# Patient Record
Sex: MALE | Race: WHITE | URBAN - METROPOLITAN AREA
[De-identification: names, ages, dates, MRNs, and addresses within clinical notes are randomized per-mention and may not be internally consistent; named-entity substitution may affect disease eponyms.]

---

## 2020-11-20 ENCOUNTER — AMBULATORY - HEALTHEAST (OUTPATIENT)
Dept: BEHAVIORAL HEALTH | Facility: CLINIC | Age: 60
End: 2020-11-20

## 2020-11-21 ENCOUNTER — COMMUNICATION - HEALTHEAST (OUTPATIENT)
Dept: BEHAVIORAL HEALTH | Facility: CLINIC | Age: 60
End: 2020-11-21

## 2021-06-13 NOTE — TELEPHONE ENCOUNTER
R:   GRACIE paged  At 8am to follow up on pt and placement to unit.   GRACIE Chacko called back and following up with unit.    -   Pt will admit on AM shift!       [facility] updated to call for nurse to nurse report at 8:26am
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
